# Patient Record
Sex: FEMALE | NOT HISPANIC OR LATINO | Employment: FULL TIME | URBAN - METROPOLITAN AREA
[De-identification: names, ages, dates, MRNs, and addresses within clinical notes are randomized per-mention and may not be internally consistent; named-entity substitution may affect disease eponyms.]

---

## 2023-05-17 PROBLEM — F32.A DEPRESSION: Status: ACTIVE | Noted: 2023-05-17

## 2023-05-17 PROBLEM — N92.6 IRREGULAR MENSES: Status: ACTIVE | Noted: 2023-05-17

## 2023-05-17 PROBLEM — N94.3 PMS (PREMENSTRUAL SYNDROME): Status: ACTIVE | Noted: 2023-05-17

## 2023-05-17 PROBLEM — F41.9 ANXIETY: Status: ACTIVE | Noted: 2023-05-17

## 2023-05-17 RX ORDER — BUPROPION HYDROCHLORIDE 300 MG/1
1 TABLET ORAL DAILY
COMMUNITY
Start: 2018-08-30 | End: 2023-05-18 | Stop reason: ALTCHOICE

## 2023-05-18 ENCOUNTER — OFFICE VISIT (OUTPATIENT)
Dept: PRIMARY CARE | Facility: CLINIC | Age: 28
End: 2023-05-18
Payer: COMMERCIAL

## 2023-05-18 VITALS
HEART RATE: 90 BPM | BODY MASS INDEX: 22.5 KG/M2 | SYSTOLIC BLOOD PRESSURE: 107 MMHG | HEIGHT: 64 IN | DIASTOLIC BLOOD PRESSURE: 75 MMHG | WEIGHT: 131.8 LBS | TEMPERATURE: 97.6 F

## 2023-05-18 DIAGNOSIS — N94.6 DYSMENORRHEA: ICD-10-CM

## 2023-05-18 DIAGNOSIS — F41.9 ANXIETY: ICD-10-CM

## 2023-05-18 DIAGNOSIS — Z00.00 HEALTHCARE MAINTENANCE: Primary | ICD-10-CM

## 2023-05-18 DIAGNOSIS — Z30.431 IUD CHECK UP: ICD-10-CM

## 2023-05-18 DIAGNOSIS — Z23 ENCOUNTER FOR VACCINATION: ICD-10-CM

## 2023-05-18 DIAGNOSIS — F32.A DEPRESSION, UNSPECIFIED DEPRESSION TYPE: ICD-10-CM

## 2023-05-18 PROCEDURE — 90715 TDAP VACCINE 7 YRS/> IM: CPT | Performed by: FAMILY MEDICINE

## 2023-05-18 PROCEDURE — 99385 PREV VISIT NEW AGE 18-39: CPT | Performed by: FAMILY MEDICINE

## 2023-05-18 PROCEDURE — 1036F TOBACCO NON-USER: CPT | Performed by: FAMILY MEDICINE

## 2023-05-18 PROCEDURE — 90471 IMMUNIZATION ADMIN: CPT | Performed by: FAMILY MEDICINE

## 2023-05-18 RX ORDER — BUPROPION HYDROCHLORIDE 150 MG/1
150 TABLET ORAL EVERY MORNING
Qty: 30 TABLET | Refills: 2 | Status: SHIPPED | OUTPATIENT
Start: 2023-05-18 | End: 2023-08-16

## 2023-05-18 RX ORDER — COPPER 313.4 MG/1
1 INTRAUTERINE DEVICE INTRAUTERINE ONCE
COMMUNITY

## 2023-05-18 ASSESSMENT — ENCOUNTER SYMPTOMS
PALPITATIONS: 0
SHORTNESS OF BREATH: 0
DIARRHEA: 0
NAUSEA: 0
ABDOMINAL PAIN: 0
ACTIVITY CHANGE: 0
CONSTIPATION: 0
BLOOD IN STOOL: 0
APPETITE CHANGE: 0
VOMITING: 0
CHEST TIGHTNESS: 0
UNEXPECTED WEIGHT CHANGE: 0

## 2023-05-18 ASSESSMENT — ANXIETY QUESTIONNAIRES
4. TROUBLE RELAXING: NEARLY EVERY DAY
7. FEELING AFRAID AS IF SOMETHING AWFUL MIGHT HAPPEN: NEARLY EVERY DAY
GAD7 TOTAL SCORE: 18
2. NOT BEING ABLE TO STOP OR CONTROL WORRYING: NEARLY EVERY DAY
3. WORRYING TOO MUCH ABOUT DIFFERENT THINGS: NEARLY EVERY DAY
6. BECOMING EASILY ANNOYED OR IRRITABLE: NEARLY EVERY DAY
1. FEELING NERVOUS, ANXIOUS, OR ON EDGE: NEARLY EVERY DAY
5. BEING SO RESTLESS THAT IT IS HARD TO SIT STILL: NOT AT ALL
IF YOU CHECKED OFF ANY PROBLEMS ON THIS QUESTIONNAIRE, HOW DIFFICULT HAVE THESE PROBLEMS MADE IT FOR YOU TO DO YOUR WORK, TAKE CARE OF THINGS AT HOME, OR GET ALONG WITH OTHER PEOPLE: VERY DIFFICULT

## 2023-05-18 ASSESSMENT — PATIENT HEALTH QUESTIONNAIRE - PHQ9
1. LITTLE INTEREST OR PLEASURE IN DOING THINGS: SEVERAL DAYS
10. IF YOU CHECKED OFF ANY PROBLEMS, HOW DIFFICULT HAVE THESE PROBLEMS MADE IT FOR YOU TO DO YOUR WORK, TAKE CARE OF THINGS AT HOME, OR GET ALONG WITH OTHER PEOPLE: SOMEWHAT DIFFICULT
SUM OF ALL RESPONSES TO PHQ9 QUESTIONS 1 AND 2: 2
2. FEELING DOWN, DEPRESSED OR HOPELESS: SEVERAL DAYS

## 2023-05-18 ASSESSMENT — PROMIS GLOBAL HEALTH SCALE
RATE_MENTAL_HEALTH: POOR
RATE_PHYSICAL_HEALTH: GOOD
CARRYOUT_SOCIAL_ACTIVITIES: FAIR
EMOTIONAL_PROBLEMS: ALWAYS
RATE_QUALITY_OF_LIFE: GOOD
CARRYOUT_PHYSICAL_ACTIVITIES: COMPLETELY
RATE_SOCIAL_SATISFACTION: VERY GOOD
RATE_AVERAGE_FATIGUE: MODERATE
RATE_GENERAL_HEALTH: GOOD
RATE_AVERAGE_PAIN: 4

## 2023-05-18 NOTE — PROGRESS NOTES
"Subjective   Patient ID: Yuliana Chavez is a 27 y.o. female who presents for Annual Exam (She is fasting for blood work today. ), Anxiety, and Menstrual Problem (She had a junior IUD placed in December and her periods have gone from 3 days to 8 days and her pain has gotten worse. ).    HPI   28 yo female presents for a physical    was last seen 3/2020    Sees gyn for exams/paps= gyn is in [place]- had copper IUD placed in dec 2022; periods have been Longer and heavier since with cramping. Never fu with gyn for recheck and US   Is home for the month and requests US order  Is sexually active.  Monogamous; no concerns  about STDs    hx of depression/anxiety- was seeing psych in past and was on wellbutrin  was doing well so stopped about 1.5 yrs ago  was seeing a therapist in past  but not recently.  mood has been ok. doing things she enjoys.  Just more anxious      graduated from [employer] spring 2019 - major [employer] science         immunizations  flu shot- had  Covid shots- had  Tetanus 2013- booster today       BMI 22; has been trying to exercise; runs;  and eat healthy.   No recent labs/lipids     see dillan   sees her dentist for regular cleanings      She denies any chest pains, palpitations, edema, shortness of breath, abdominal pains, reflux, nausea, vomiting, diarrhea, constipation, blood in stool, melena.      no mole changes.         Review of Systems   Constitutional:  Negative for activity change, appetite change and unexpected weight change.   Respiratory:  Negative for chest tightness and shortness of breath.    Cardiovascular:  Negative for chest pain, palpitations and leg swelling.   Gastrointestinal:  Negative for abdominal pain, blood in stool, constipation, diarrhea, nausea and vomiting.       Objective   /75   Pulse 90   Temp 36.4 °C (97.6 °F)   Ht 1.626 m (5' 4\")   Wt 59.8 kg (131 lb 12.8 oz)   BMI 22.62 kg/m²     Physical Exam  Vitals and nursing note reviewed.   Constitutional:       " Appearance: Normal appearance. She is normal weight.   HENT:      Head: Normocephalic and atraumatic.      Right Ear: Tympanic membrane, ear canal and external ear normal.      Left Ear: Tympanic membrane, ear canal and external ear normal.      Nose: Nose normal.      Mouth/Throat:      Mouth: Mucous membranes are moist.      Pharynx: Oropharynx is clear.   Eyes:      Extraocular Movements: Extraocular movements intact.      Conjunctiva/sclera: Conjunctivae normal.      Pupils: Pupils are equal, round, and reactive to light.   Cardiovascular:      Rate and Rhythm: Normal rate and regular rhythm.   Pulmonary:      Effort: Pulmonary effort is normal.      Breath sounds: Normal breath sounds.   Abdominal:      General: Abdomen is flat. Bowel sounds are normal.      Palpations: Abdomen is soft.   Musculoskeletal:         General: Normal range of motion.      Cervical back: Neck supple.   Skin:     General: Skin is warm and dry.   Neurological:      General: No focal deficit present.      Mental Status: She is alert and oriented to person, place, and time.   Psychiatric:         Mood and Affect: Mood normal.         Behavior: Behavior normal.         Thought Content: Thought content normal.         Judgment: Judgment normal.         Assessment/Plan   Problem List Items Addressed This Visit          Other    Anxiety    Relevant Medications    buPROPion XL (Wellbutrin XL) 150 mg 24 hr tablet    Depression    Relevant Medications    buPROPion XL (Wellbutrin XL) 150 mg 24 hr tablet     Other Visit Diagnoses       Healthcare maintenance    -  Primary    Relevant Orders    CBC    Comprehensive Metabolic Panel    Hemoglobin A1C    Lipid Panel    TSH with reflex to Free T4 if abnormal    Vitamin D, Total    Encounter for vaccination        Relevant Orders    Tdap vaccine, age 10 years and older  (BOOSTRIX) (Completed)    IUD check up        Relevant Orders    US pelvis transvaginal    US pelvis    Dysmenorrhea        Relevant  Orders    US pelvis transvaginal    US pelvis          Restart wellbutrin; fu with psych/therapist in [place]   healthy lifestyle   Tdap given today    Check pelvic US; f/u with gyn   Check labs